# Patient Record
Sex: FEMALE | Race: WHITE | NOT HISPANIC OR LATINO | Employment: STUDENT | URBAN - METROPOLITAN AREA
[De-identification: names, ages, dates, MRNs, and addresses within clinical notes are randomized per-mention and may not be internally consistent; named-entity substitution may affect disease eponyms.]

---
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
## 2022-01-02 ENCOUNTER — MYC MEDICAL ADVICE (OUTPATIENT)
Dept: FAMILY MEDICINE | Facility: CLINIC | Age: 22
End: 2022-01-02
Payer: COMMERCIAL

## 2022-01-03 NOTE — TELEPHONE ENCOUNTER
Dr. Kelley,    Patient sends my chart stating she needs repeat covid test for work. Elzbieta YOUSSEF RN

## 2022-01-09 ENCOUNTER — HEALTH MAINTENANCE LETTER (OUTPATIENT)
Age: 22
End: 2022-01-09

## 2022-01-25 ENCOUNTER — MYC MEDICAL ADVICE (OUTPATIENT)
Dept: FAMILY MEDICINE | Facility: CLINIC | Age: 22
End: 2022-01-25
Payer: COMMERCIAL

## 2022-01-31 ENCOUNTER — ALLIED HEALTH/NURSE VISIT (OUTPATIENT)
Dept: FAMILY MEDICINE | Facility: CLINIC | Age: 22
End: 2022-01-31
Payer: COMMERCIAL

## 2022-01-31 ENCOUNTER — IMMUNIZATION (OUTPATIENT)
Dept: FAMILY MEDICINE | Facility: CLINIC | Age: 22
End: 2022-01-31
Payer: COMMERCIAL

## 2022-01-31 DIAGNOSIS — Z23 NEED FOR PROPHYLACTIC VACCINATION AND INOCULATION AGAINST INFLUENZA: Primary | ICD-10-CM

## 2022-01-31 PROCEDURE — 90471 IMMUNIZATION ADMIN: CPT

## 2022-01-31 PROCEDURE — 86580 TB INTRADERMAL TEST: CPT

## 2022-01-31 PROCEDURE — 99207 PR NO CHARGE NURSE ONLY: CPT

## 2022-01-31 PROCEDURE — 91305 COVID-19,PF,PFIZER (12+ YRS): CPT

## 2022-01-31 PROCEDURE — 90686 IIV4 VACC NO PRSV 0.5 ML IM: CPT

## 2022-01-31 PROCEDURE — 0051A COVID-19,PF,PFIZER (12+ YRS): CPT

## 2022-01-31 NOTE — NURSING NOTE
"Patient is here today for a Mantoux (TST) test placement.    Is there a current order in the chart? No. Placed order according to standing order (reference the \"Skin Test- Tuberculosis Screening- Ambulatory Care\" standing order in [employer]). Review the Inclusion and Exclusion Criteria.          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 Patient to return for second test in 1-3 weeks after first test is read.     Exclusion Criteria  None - Place order for Mantoux (TST) test per standing order.    Reason for Mantoux (TST) in patient's own words: Going to [facility]     Patient needs form signed? No - form not needed per patient.    Instructed patient to wait for 15 minutes post injection and to report any reactions immediately to staff.    Told patient to return to clinic in 48-72 hours to have Mantoux (TST) read.           "

## 2022-02-03 ENCOUNTER — ALLIED HEALTH/NURSE VISIT (OUTPATIENT)
Dept: FAMILY MEDICINE | Facility: CLINIC | Age: 22
End: 2022-02-03
Payer: COMMERCIAL

## 2022-02-03 DIAGNOSIS — Z11.1 SCREENING EXAMINATION FOR PULMONARY TUBERCULOSIS: Primary | ICD-10-CM

## 2022-02-03 LAB
PPDINDURATION: 0 MM (ref 0–4.99)
PPDREDNESS: NORMAL

## 2022-02-03 PROCEDURE — 99207 PR NO CHARGE NURSE ONLY: CPT

## 2022-02-03 NOTE — PROGRESS NOTES
Pt reports after recent covid/influenza vaccine in right arm she is experiencing aching pain under upper arm but not into axilla area. No redness, no swelling, no lumps. Cms intact. She could not show me due to her clothing. Advised pt I could make appt for a provider to look at. She will wait and see how it goes. Advised if any worsening to be seen. Pt agreed.     Red Bridges RN

## 2022-02-03 NOTE — PROGRESS NOTES
Patient is here today for a Mantoux (TST) test results.    Did patient return to clinic 48-72 hours from Mantoux (TST) placement:   Yes -     PPD Induration   Date Value Ref Range Status   02/03/2022 0 0 - 4.99 mm Final     PPD Redness   Date Value Ref Range Status   02/03/2022 Not Present  Final        Induration Size? Induration <5mm - Enter results in Enter/Edit Activity. Route results to ordering provider.     Patient needs form signed? No    Patient reports having previously had the BCG Vaccine: No    Does patient need a two step? No    Red Bridges RN

## 2022-02-21 ENCOUNTER — IMMUNIZATION (OUTPATIENT)
Dept: FAMILY MEDICINE | Facility: CLINIC | Age: 22
End: 2022-02-21
Attending: FAMILY MEDICINE
Payer: COMMERCIAL

## 2022-02-21 PROCEDURE — 0052A COVID-19,PF,PFIZER (12+ YRS): CPT

## 2022-02-21 PROCEDURE — 91305 COVID-19,PF,PFIZER (12+ YRS): CPT

## 2022-03-24 ENCOUNTER — OFFICE VISIT (OUTPATIENT)
Dept: FAMILY MEDICINE | Facility: CLINIC | Age: 22
End: 2022-03-24
Payer: COMMERCIAL

## 2022-03-24 VITALS
HEIGHT: 68 IN | WEIGHT: 230.6 LBS | OXYGEN SATURATION: 98 % | SYSTOLIC BLOOD PRESSURE: 100 MMHG | RESPIRATION RATE: 16 BRPM | DIASTOLIC BLOOD PRESSURE: 70 MMHG | TEMPERATURE: 97.5 F | BODY MASS INDEX: 34.95 KG/M2 | HEART RATE: 71 BPM

## 2022-03-24 DIAGNOSIS — F43.23 SITUATIONAL MIXED ANXIETY AND DEPRESSIVE DISORDER: ICD-10-CM

## 2022-03-24 DIAGNOSIS — R43.2 LOSS OF TASTE: Primary | ICD-10-CM

## 2022-03-24 DIAGNOSIS — R43.0 LOSS OF SMELL: ICD-10-CM

## 2022-03-24 PROCEDURE — 99213 OFFICE O/P EST LOW 20 MIN: CPT | Performed by: NURSE PRACTITIONER

## 2022-03-24 ASSESSMENT — PAIN SCALES - GENERAL: PAINLEVEL: NO PAIN (0)

## 2022-03-24 NOTE — PROGRESS NOTES
Assessment & Plan     Loss of taste  Patient has persistent loss of taste and smell since COVID 19 infection in December 2021.  I recommend using smell training with essential oils twice daily for a couple months.  If no improvement, ENT referral placed to discuss options for treatment.  Discussed that this can be more permanent in some cases but reassured that usually it will gradually come back and can take some time.   - Otolaryngology Referral; Future    Loss of smell  See note above.  - Otolaryngology Referral; Future    Situational mixed anxiety and depressive disorder  Patient has had hx of being on medication in the past and currently with COVID 19 symptoms being persistent has been struggling with this again.  She does not feel this is affecting her life at this time.  Mom feels that it has been bad.  Patient declines counseling and medication at this time.  I offered that she can call if she changes her mind and we could try Citalopram or Zoloft instead of the Lexapro since this did not work well in the past.    See Patient Instructions    Return in about 3 months (around 6/24/2022) for Follow up with ENT.    Taina Cedeño NP  [facility]    Derek Gimenez is a 22 year old who presents for the following health issues  accompanied by her mother.    HPI     Chief Complaint   Patient presents with     Covid Concern     follow up on loss of taste and smell; diagnosed with COVID 12/29; still cannot smell, taste has been altered and cannot taste much     Presents today with her mother.  Mother is concerned about her mental health because of the persistent symptoms of her loss and taste of smell.  Patient states that everything she eats tastes like disinfectant.  She has last about 30 pounds in the last couple months due to lack of wanting to eat.  However she does feel that she does have an appetite.  Patient states that she is still doing well in school and her  "depression/anxiety with everything going on does not seem to be affecting her life in that respect.  She has been on medication with Lexapro in the past but did not feel that was helpful for her symptoms and has struggled with some depression anxiety.    Review of Systems   CONSTITUTIONAL: NEGATIVE for fever, chills, change in weight  ENT/MOUTH: POSITIVE for parosmia following covid 19   RESP: NEGATIVE for significant cough or SOB  CV: NEGATIVE for chest pain, palpitations or peripheral edema  PSYCHIATRIC: POSITIVE foranxiety, appetite disturbance secondary to COVID 19 loss of taste and smell, depressed mood, Hx anxiety and Hx depression  ROS otherwise negative      Objective    /70 (BP Location: Right arm, Patient Position: Sitting, Cuff Size: Adult Large)   Pulse 71   Temp 97.5  F (36.4  C) (Tympanic)   Resp 16   Ht 1.727 m (5' 8\")   Wt 104.6 kg (230 lb 9.6 oz)   LMP 03/23/2022 (Exact Date)   SpO2 98%   Breastfeeding No   BMI 35.06 kg/m    Body mass index is 35.06 kg/m .     Physical Exam   GENERAL: healthy, alert and no distress  PSYCH: mentation appears normal and affect flat         "

## 2022-03-24 NOTE — PATIENT INSTRUCTIONS
1.  Smell training involves sniffing at least four different odors twice a day every day for several months.   Use essential oils of lemon, nae, clove, and eucalyptus.  2.  Follow-up with ENT in 2-3 months if symptoms are not improving.

## 2022-05-18 ENCOUNTER — OFFICE VISIT (OUTPATIENT)
Dept: OTOLARYNGOLOGY | Facility: CLINIC | Age: 22
End: 2022-05-18
Payer: COMMERCIAL

## 2022-05-18 VITALS
HEART RATE: 91 BPM | RESPIRATION RATE: 16 BRPM | SYSTOLIC BLOOD PRESSURE: 105 MMHG | DIASTOLIC BLOOD PRESSURE: 70 MMHG | OXYGEN SATURATION: 96 %

## 2022-05-18 DIAGNOSIS — R43.8 HYPOSMIA: Primary | ICD-10-CM

## 2022-05-18 DIAGNOSIS — J30.2 SEASONAL ALLERGIC RHINITIS, UNSPECIFIED TRIGGER: ICD-10-CM

## 2022-05-18 PROCEDURE — 99243 OFF/OP CNSLTJ NEW/EST LOW 30: CPT | Performed by: OTOLARYNGOLOGY

## 2022-05-18 RX ORDER — CETIRIZINE HYDROCHLORIDE 10 MG/1
10 TABLET ORAL DAILY
Qty: 30 TABLET | Refills: 11 | Status: SHIPPED | OUTPATIENT
Start: 2022-05-18

## 2022-05-18 RX ORDER — FLUTICASONE PROPIONATE 50 MCG
2 SPRAY, SUSPENSION (ML) NASAL DAILY
Qty: 16 G | Refills: 11 | Status: SHIPPED | OUTPATIENT
Start: 2022-05-18

## 2022-05-18 NOTE — LETTER
5/18/2022         RE: Ammy Santos  [address] [place] [phone]        Dear Colleague,    Thank you for referring your patient, Ammy Santos, to the [facility]. Please see a copy of my visit note below.    I am seeing this patient in consultation for loss of taste, loss of smell at the request of the provider Taina Pope.    Chief Complaint - loss of smell    History of Present Illness - Ammy Santos is a 22 year old female who presents for evaluation of loss of smell. This happened with covid in December. She has some smells, but overall things smell and taste chemically. She has some phantom smells. This has also affected the sense of taste. The patient has the other following nose symptoms - none, denies sinus pain, congestion, drainage. The patient notes some allergies. She doesn't take much for allergy medication. Treatments have included acupuncture. she denies trying nasal irrigations, nasal steroids, and oral antihistamines. No history of nasal trauma. No prior history of nasal surgery. nonsmoker. no epistaxis. I personally reviewed the relevant clinical notes in Epic including the primary care providers note.     Past Medical History -   Patient Active Problem List   Diagnosis     Psoriasis     Acne     Allergies -   Allergies   Allergen Reactions     Penicillins Hives     All the cillins.  Paola Edmonds         Social History -   Social History     Socioeconomic History     Marital status: Single   Tobacco Use     Smoking status: Passive Smoke Exposure - Never Smoker     Smokeless tobacco: Never Used   Vaping Use     Vaping Use: Never used   Substance and Sexual Activity     Alcohol use: No     Drug use: No     Sexual activity: Never       Family History -   Family History   Problem Relation Age of Onset     Thyroid Disease Father         psoriasis     Cancer Maternal Grandmother         lung     Heart Disease Maternal Grandmother 73         pace maker     Thyroid Disease Paternal Grandfather      C.A.D. Maternal Grandfather 73        pacemaker placed     Thyroid Disease Paternal Aunt         psoriasis     Thyroid Disease Paternal Uncle      Review of Systems - As per HPI and PMHx, otherwise 7 system review of the head and neck is negative.    Physical Exam  General - The patient is in no distress. Alert and oriented to person and place, answers questions and cooperates with examination appropriately.   Neurologic - CN II-XII are grossly intact. No focal neurologic deficits.   Voice and Breathing - The patient was breathing comfortably without the use of accessory muscles. There was no wheezing, stridor, or stertor.  The patients voice was clear and strong.  Eyes - Extraocular movements intact. Sclera were not icteric or injected, conjunctiva were pink and moist.  Mouth - Examination of the oral cavity showed pink, healthy oral mucosa. No lesions or ulcerations noted.  The tongue was mobile and midline, and the dentition were in good condition.    Throat - The walls of the oropharynx were smooth, pink, moist, symmetric, and had no lesions or ulcerations.  The tonsillar pillars and soft palate were symmetric.  The uvula was midline on elevation.  Neck -  Soft, non-tender. Palpation of the occipital, submental, submandibular, internal jugular chain, and supraclavicular nodes did not demonstrate any abnormal lymph nodes or masses. No parotid masses. Palpation of the thyroid was soft and smooth, with no nodules or goiter appreciated.  The trachea was mobile and midline.  Nose - External contour is symmetric, no gross deflection or scars.  Nasal mucosa is pink and moist with clear mucus. However the turbinates are hypertrophic. The septum was midline and non-obstructive.  No polyps, masses, or purulence noted on examination.      A/P - Ammyle Santos is a 22 year old female with hyposmia following COVID infection 6 months ago.  She does have some  congestion and evidence of allergies.  She can try Zyrtec, Flonase, nasal saline irrigations.  I also strongly recommend olfactory retraining therapy.  We discussed this in detail.  If things worsen, she begins to have fall or phantom smells, she should return we can consider nasal endoscopy and possible imaging.    Byron Lala MD  Otolaryngology  [facility]          Again, thank you for allowing me to participate in the care of your patient.        Sincerely,        Byron Lala MD

## 2022-05-18 NOTE — PROGRESS NOTES
I am seeing this patient in consultation for loss of taste, loss of smell at the request of the provider Taina Pope.    Chief Complaint - loss of smell    History of Present Illness - Ammy Santos is a 22 year old female who presents for evaluation of loss of smell. This happened with covid in December. She has some smells, but overall things smell and taste chemically. She has some phantom smells. This has also affected the sense of taste. The patient has the other following nose symptoms - none, denies sinus pain, congestion, drainage. The patient notes some allergies. She doesn't take much for allergy medication. Treatments have included acupuncture. she denies trying nasal irrigations, nasal steroids, and oral antihistamines. No history of nasal trauma. No prior history of nasal surgery. nonsmoker. no epistaxis. I personally reviewed the relevant clinical notes in Epic including the primary care providers note.     Past Medical History -   Patient Active Problem List   Diagnosis     Psoriasis     Acne     Allergies -   Allergies   Allergen Reactions     Penicillins Hives     All the cillins.  Paola Edmonds         Social History -   Social History     Socioeconomic History     Marital status: Single   Tobacco Use     Smoking status: Passive Smoke Exposure - Never Smoker     Smokeless tobacco: Never Used   Vaping Use     Vaping Use: Never used   Substance and Sexual Activity     Alcohol use: No     Drug use: No     Sexual activity: Never       Family History -   Family History   Problem Relation Age of Onset     Thyroid Disease Father         psoriasis     Cancer Maternal Grandmother         lung     Heart Disease Maternal Grandmother 73        pace maker     Thyroid Disease Paternal Grandfather      C.A.D. Maternal Grandfather 73        pacemaker placed     Thyroid Disease Paternal Aunt         psoriasis     Thyroid Disease Paternal Uncle      Review of Systems - As per HPI and PMHx, otherwise  7 system review of the head and neck is negative.    Physical Exam  General - The patient is in no distress. Alert and oriented to person and place, answers questions and cooperates with examination appropriately.   Neurologic - CN II-XII are grossly intact. No focal neurologic deficits.   Voice and Breathing - The patient was breathing comfortably without the use of accessory muscles. There was no wheezing, stridor, or stertor.  The patients voice was clear and strong.  Eyes - Extraocular movements intact. Sclera were not icteric or injected, conjunctiva were pink and moist.  Mouth - Examination of the oral cavity showed pink, healthy oral mucosa. No lesions or ulcerations noted.  The tongue was mobile and midline, and the dentition were in good condition.    Throat - The walls of the oropharynx were smooth, pink, moist, symmetric, and had no lesions or ulcerations.  The tonsillar pillars and soft palate were symmetric.  The uvula was midline on elevation.  Neck -  Soft, non-tender. Palpation of the occipital, submental, submandibular, internal jugular chain, and supraclavicular nodes did not demonstrate any abnormal lymph nodes or masses. No parotid masses. Palpation of the thyroid was soft and smooth, with no nodules or goiter appreciated.  The trachea was mobile and midline.  Nose - External contour is symmetric, no gross deflection or scars.  Nasal mucosa is pink and moist with clear mucus. However the turbinates are hypertrophic. The septum was midline and non-obstructive.  No polyps, masses, or purulence noted on examination.      A/P - Ammy Santos is a 22 year old female with hyposmia following COVID infection 6 months ago.  She does have some congestion and evidence of allergies.  She can try Zyrtec, Flonase, nasal saline irrigations.  I also strongly recommend olfactory retraining therapy.  We discussed this in detail.  If things worsen, she begins to have fall or phantom smells, she should return we  can consider nasal endoscopy and possible imaging.    Byron Lala MD  Otolaryngology  [facility]

## 2022-09-14 ENCOUNTER — APPOINTMENT (OUTPATIENT)
Dept: GENERAL RADIOLOGY | Facility: CLINIC | Age: 22
End: 2022-09-14
Attending: FAMILY MEDICINE

## 2022-09-14 ENCOUNTER — HOSPITAL ENCOUNTER (EMERGENCY)
Facility: CLINIC | Age: 22
Discharge: HOME | End: 2022-09-14
Attending: FAMILY MEDICINE | Admitting: FAMILY MEDICINE

## 2022-09-14 VITALS
WEIGHT: 220 LBS | SYSTOLIC BLOOD PRESSURE: 126 MMHG | HEIGHT: 67 IN | HEART RATE: 90 BPM | BODY MASS INDEX: 34.53 KG/M2 | TEMPERATURE: 97.3 F | OXYGEN SATURATION: 100 % | DIASTOLIC BLOOD PRESSURE: 75 MMHG | RESPIRATION RATE: 16 BRPM

## 2022-09-14 DIAGNOSIS — S49.91XA SHOULDER INJURY, RIGHT, INITIAL ENCOUNTER: ICD-10-CM
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
## 2022-09-14 DIAGNOSIS — S50.12XA CONTUSION OF LEFT FOREARM, INITIAL ENCOUNTER: ICD-10-CM

## 2022-09-14 DIAGNOSIS — S60.221A CONTUSION OF RIGHT HAND, INITIAL ENCOUNTER: ICD-10-CM

## 2022-09-14 PROCEDURE — 73090 X-RAY EXAM OF FOREARM: CPT | Mod: LT

## 2022-09-14 PROCEDURE — 250N000013 HC RX MED GY IP 250 OP 250 PS 637: Performed by: FAMILY MEDICINE

## 2022-09-14 PROCEDURE — 73030 X-RAY EXAM OF SHOULDER: CPT | Mod: LT

## 2022-09-14 PROCEDURE — 99285 EMERGENCY DEPT VISIT HI MDM: CPT | Mod: 25 | Performed by: FAMILY MEDICINE

## 2022-09-14 PROCEDURE — 73130 X-RAY EXAM OF HAND: CPT | Mod: RT

## 2022-09-14 PROCEDURE — 71046 X-RAY EXAM CHEST 2 VIEWS: CPT

## 2022-09-14 PROCEDURE — 99284 EMERGENCY DEPT VISIT MOD MDM: CPT | Performed by: FAMILY MEDICINE

## 2022-09-14 RX ORDER — IBUPROFEN 400 MG/1
400 TABLET, FILM COATED ORAL ONCE
Status: COMPLETED | OUTPATIENT
Start: 2022-09-14 | End: 2022-09-14

## 2022-09-14 RX ORDER — ACETAMINOPHEN 500 MG
1000 TABLET ORAL ONCE
Status: COMPLETED | OUTPATIENT
Start: 2022-09-14 | End: 2022-09-14

## 2022-09-14 RX ORDER — OXYCODONE HYDROCHLORIDE 5 MG/1
5 TABLET ORAL EVERY 6 HOURS PRN
Qty: 12 TABLET | Refills: 0 | Status: SHIPPED | OUTPATIENT
Start: 2022-09-14 | End: 2022-09-17

## 2022-09-14 RX ADMIN — IBUPROFEN 400 MG: 400 TABLET ORAL at 18:34

## 2022-09-14 RX ADMIN — ACETAMINOPHEN 1000 MG: 500 TABLET, FILM COATED ORAL at 18:34

## 2022-09-14 ASSESSMENT — ACTIVITIES OF DAILY LIVING (ADL): ADLS_ACUITY_SCORE: 35
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
## 2022-09-15 ENCOUNTER — PATIENT OUTREACH (OUTPATIENT)
Dept: FAMILY MEDICINE | Facility: CLINIC | Age: 22
End: 2022-09-15

## 2022-09-15 NOTE — DISCHARGE INSTRUCTIONS
Ice 20 minutes out of each hour while awake to traumatized areas next 48 hours.  Tylenol/ibuprofen as needed for pain.  Oxycodone for breakthrough pain as needed first couple of days.  Return to the emergency department if worse or changes.

## 2022-09-15 NOTE — TELEPHONE ENCOUNTER
ED / Discharge Outreach Protocol    Patient Contact    Attempt # 1    Was call answered?  No.  Left message on voicemail with information to call me back.    Marlene Denise RN  [facility]

## 2022-10-13 ENCOUNTER — TELEPHONE (OUTPATIENT)
Dept: FAMILY MEDICINE | Facility: CLINIC | Age: 22
End: 2022-10-13

## 2022-10-13 NOTE — TELEPHONE ENCOUNTER
Reason for call:  Other   Patient called regarding (reason for call): appointment  Additional comments: Please call the patients parent back to assist in scheduling a preventative care appointment for the patient with the PCP preferably, but Is willing to see any female provider for this at [place].    Phone number to reach patient:  Other phone number:  Patient parent requested you call the parent back at [phone]    Best Time:  Any    Can we leave a detailed message on this number?  NO    Travel screening: Not Applicable

## 2022-10-14 NOTE — TELEPHONE ENCOUNTER
Left message for patient to return call to clinic RN. We do not have consent on file to talk to parents.  Joy Hooks RN

## 2022-11-07 NOTE — PROGRESS NOTES
SUBJECTIVE:   CC: Ammy is an 22 year old who presents for preventive health visit.       Patient has been advised of split billing requirements and indicates understanding: Yes  Healthy Habits:     Getting at least 3 servings of Calcium per day:  NO    Bi-annual eye exam:  NO    Dental care twice a year:  NO    Sleep apnea or symptoms of sleep apnea:  None    Diet:  Regular (no restrictions)    Frequency of exercise:  None    Taking medications regularly:  Yes    Medication side effects:  None    PHQ-2 Total Score: 2    Additional concerns today:  No    Today's PHQ-2 Score:   PHQ-2 ( 1999 [employer]) 11/8/2022   Q1: Little interest or pleasure in doing things 1   Q2: Feeling down, depressed or hopeless 1   PHQ-2 Score 2   PHQ-2 Total Score (12-17 Years)- Positive if 3 or more points; Administer PHQ-A if positive -   Q1: Little interest or pleasure in doing things Several days   Q2: Feeling down, depressed or hopeless Several days   PHQ-2 Score 2     Abuse: Current or Past (Physical, Sexual or Emotional) - No  Do you feel safe in your environment? Yes    Have you ever done Advance Care Planning? (For example, a Health Directive, POLST, or a discussion with a medical provider or your loved ones about your wishes): No, advance care planning information given to patient to review.  Patient plans to discuss their wishes with loved ones or provider.      Social History     Tobacco Use     Smoking status: Never     Passive exposure: Yes     Smokeless tobacco: Never   Substance Use Topics     Alcohol use: No     If you drink alcohol do you typically have >3 drinks per day or >7 drinks per week? Not applicable    Alcohol Use 11/8/2022   Prescreen: >3 drinks/day or >7 drinks/week? No   Prescreen: >3 drinks/day or >7 drinks/week? -     Reviewed orders with patient.  Reviewed health maintenance and updated orders accordingly - Yes  Labs reviewed in EPIC  BP Readings from Last 3 Encounters:   11/08/22 128/68   09/14/22  126/75   05/18/22 105/70    Wt Readings from Last 3 Encounters:   11/08/22 99.2 kg (218 lb 9.6 oz)   09/14/22 99.8 kg (220 lb)   03/24/22 104.6 kg (230 lb 9.6 oz)                  Patient Active Problem List   Diagnosis     Psoriasis     Acne     History reviewed. No pertinent surgical history.    Social History     Tobacco Use     Smoking status: Never     Passive exposure: Yes     Smokeless tobacco: Never   Substance Use Topics     Alcohol use: No     Family History   Problem Relation Age of Onset     Thyroid Disease Father         psoriasis     Cancer Maternal Grandmother         lung     Heart Disease Maternal Grandmother 73        pace maker     SHAUN Maternal Grandfather 73        pacemaker placed     Thyroid Disease Paternal Grandfather      Thyroid Disease Paternal Aunt         psoriasis     Thyroid Disease Paternal Uncle          Current Outpatient Medications   Medication Sig Dispense Refill     cetirizine (ZYRTEC) 10 MG tablet Take 1 tablet (10 mg) by mouth daily 30 tablet 11     FLUoxetine (PROZAC) 20 MG capsule Take 20 mg by mouth daily       fluticasone (FLONASE) 50 MCG/ACT nasal spray Spray 2 sprays into both nostrils daily 16 g 11     hydrOXYzine (ATARAX) 25 MG tablet Take 25 mg by mouth 2 times daily as needed       Allergies   Allergen Reactions     Penicillins Hives     All the cillins.  Paola Edmonds         Breast Cancer Screening:  Not indicated due to age.      History of abnormal Pap smear: NO - under age 21, PAP not appropriate for age     Reviewed and updated as needed this visit by clinical staff   Tobacco  Allergies  Meds   Med Hx  Surg Hx  Fam Hx  Soc Hx        Reviewed and updated as needed this visit by Provider   Tobacco  Allergies  Meds   Med Hx  Surg Hx  Fam Hx  Soc Hx       Past Medical History:   Diagnosis Date     Closed fracture of unspecified part of radius with ulna(813.83) 6/03    fx lt forarm      History reviewed. No pertinent surgical history.    Review of  "Systems   Constitutional: Negative for chills and fever.   HENT: Negative for congestion, ear pain, hearing loss and sore throat.    Eyes: Negative for pain and visual disturbance.   Respiratory: Negative for cough and shortness of breath.    Cardiovascular: Negative for chest pain, palpitations and peripheral edema.   Gastrointestinal: Negative for abdominal pain, constipation, diarrhea, heartburn, hematochezia and nausea.   Breasts:  Negative for tenderness, breast mass and discharge.   Genitourinary: Negative for dysuria, frequency, genital sores, hematuria, pelvic pain, urgency, vaginal bleeding and vaginal discharge.   Musculoskeletal: Negative for arthralgias, joint swelling and myalgias.   Skin: Negative for rash.   Neurological: Positive for headaches. Negative for dizziness, weakness and paresthesias.   Psychiatric/Behavioral: Negative for mood changes. The patient is not nervous/anxious.      Getting headaches frequently, 4 times throughout the week.  She takes excedrin migraine helps sometimes after a couple hours will come back or will come back the next day.  No aura, no light or sound sensitivity, gets nausea but no vomiting with it, persistent and not correlating around menstrual cycle.  She does get adjusted by chiropractic frequently.  No other correlations to this.  No hx concussions or mild TBIs.  This has been going on \"as long as I can remember\".  Headache affects right upper forehead area.  Denies any congestion in the right sinus.  They last about 2-4 hours if she does nothing for treatment, if she takes the migraine pills OTC and will go away in 1 hour.  Using migraine medication occasionally once or twice a week but tries not to use it.  Trying to drink a log of fluids during the day.  Denies a lot of caffeine use.       OBJECTIVE:   /68   Pulse 92   Temp 97.3  F (36.3  C) (Tympanic)   Resp 16   Ht 1.702 m (5' 7\")   Wt 99.2 kg (218 lb 9.6 oz)   SpO2 99%   BMI 34.24 kg/m  "   Physical Exam  GENERAL: healthy, alert and no distress  EYES: Eyes grossly normal to inspection, PERRL and conjunctivae and sclerae normal  HENT: ear canals and TM's normal, nose and mouth without ulcers or lesions  NECK: no adenopathy, no asymmetry, masses, or scars and thyroid normal to palpation  RESP: lungs clear to auscultation - no rales, rhonchi or wheezes  CV: regular rate and rhythm, normal S1 S2, no S3 or S4, no murmur, click or rub, no peripheral edema and peripheral pulses strong  ABDOMEN: soft, nontender, no hepatosplenomegaly, no masses and bowel sounds normal  MS: no gross musculoskeletal defects noted, no edema  SKIN: no suspicious lesions or rashes  NEURO: Normal strength and tone, mentation intact and speech normal  BACK: no CVA tenderness, no paralumbar tenderness  PSYCH: mentation appears normal and affect normal/bright  LYMPH: no cervical adenopathy    Diagnostic Test Results:  none     ASSESSMENT/PLAN:   (Z00.00) Routine general medical examination at a health care facility  (primary encounter diagnosis)  Comment: HPV dose given today as well as Influenza.  Clinical history concerns are addressed below. Exam is normal.  Recommend follow-up in 1 year for preventative exam.  Plan: REVIEW OF HEALTH MAINTENANCE PROTOCOL ORDERS,         Human Papilloma Virus Vaccine (Gardasil 9) 3         Dose IM    (R51.9) Complaint of headache  Comment: uncertain of cause.  Does not seem to be cluster, migraine or tension from clinical history and no concerns on neurological exam.  Recommend hydrating and keeping diary of headaches and how they respond to OTC treatment, triggers and length and were the headache is, if it is quick or slow onset, etc.  Recommend use of Ibuprofen 800 mg at onset of headache or excedrin Migraine as needed since this is helpful.  Plan: Follow-up in 1 month if not controlled with OTC treatment with more than one headache every week.    Patient has been advised of split billing  "requirements and indicates understanding: Yes      COUNSELING:  Reviewed preventive health counseling, as reflected in patient instructions       Regular exercise       Healthy diet/nutrition       Vision screening       Immunizations    Vaccinated for: Human Papillomavirus and Influenza             Safe sex practices/STD prevention    Estimated body mass index is 34.24 kg/m  as calculated from the following:    Height as of this encounter: 1.702 m (5' 7\").    Weight as of this encounter: 99.2 kg (218 lb 9.6 oz).    Weight management plan: Discussed healthy diet and exercise guidelines    She reports that she has never smoked. She has been exposed to tobacco smoke. She has never used smokeless tobacco.    Taina Cedeño NP  [facility]  "

## 2022-11-07 NOTE — PATIENT INSTRUCTIONS
Keep diary of your headaches.    For now try to push fluids, take ibuprofen 800 mg dose at onset of headache or 2 excedrin Migraine tabs.  Keep this listed in your diary how often you are using them and how they are affecting the headache.  Follow-up in month for recheck on headaches if not improving with use of Ibuprofen or EM once or twice weekly to resolve headache for several days.    Preventive Health Recommendations  Female Ages 18 to 20     Yearly exam:   See your health care provider every year in order to  Review health changes.   Discuss preventive care.    Review your medicines if your doctor has prescribed any.    You should be tested each year for STDs (sexually transmitted diseases).     After age 20, talk to your provider about how often you should have cholesterol testing.    If you are at risk for diabetes, you should have a diabetes test (fasting glucose).     Shots:   Get a flu shot each year.   Get a tetanus shot every 10 years.   Consider getting the shot (vaccine) that prevents cervical cancer (Gardasil).    Nutrition:   Eat at least 5 servings of fruits and vegetables each day.  Eat whole-grain bread, whole-wheat pasta and brown rice instead of white grains and rice.  Get adequate Calcium and Vitamin D.     Lifestyle  Exercise at least 150 minutes a week each week (30 minutes a day, 5 days a week). This will help you control your weight and prevent disease.  No smoking.   Wear sunscreen to prevent skin cancer.  See your dentist every six months for an exam and cleaning.

## 2022-11-08 ENCOUNTER — OFFICE VISIT (OUTPATIENT)
Dept: FAMILY MEDICINE | Facility: CLINIC | Age: 22
End: 2022-11-08
Payer: COMMERCIAL

## 2022-11-08 VITALS
RESPIRATION RATE: 16 BRPM | SYSTOLIC BLOOD PRESSURE: 128 MMHG | HEIGHT: 67 IN | DIASTOLIC BLOOD PRESSURE: 68 MMHG | WEIGHT: 218.6 LBS | OXYGEN SATURATION: 99 % | BODY MASS INDEX: 34.31 KG/M2 | TEMPERATURE: 97.3 F | HEART RATE: 92 BPM

## 2022-11-08 DIAGNOSIS — R51.9 COMPLAINT OF HEADACHE: ICD-10-CM

## 2022-11-08 DIAGNOSIS — Z00.00 ROUTINE GENERAL MEDICAL EXAMINATION AT A HEALTH CARE FACILITY: Primary | ICD-10-CM

## 2022-11-08 PROCEDURE — 90472 IMMUNIZATION ADMIN EACH ADD: CPT | Performed by: NURSE PRACTITIONER

## 2022-11-08 PROCEDURE — 99213 OFFICE O/P EST LOW 20 MIN: CPT | Mod: 25 | Performed by: NURSE PRACTITIONER

## 2022-11-08 PROCEDURE — 90651 9VHPV VACCINE 2/3 DOSE IM: CPT | Performed by: NURSE PRACTITIONER

## 2022-11-08 PROCEDURE — 90471 IMMUNIZATION ADMIN: CPT | Performed by: NURSE PRACTITIONER

## 2022-11-08 PROCEDURE — 90686 IIV4 VACC NO PRSV 0.5 ML IM: CPT | Performed by: NURSE PRACTITIONER

## 2022-11-08 PROCEDURE — 99395 PREV VISIT EST AGE 18-39: CPT | Mod: 25 | Performed by: NURSE PRACTITIONER

## 2022-11-08 RX ORDER — HYDROXYZINE HYDROCHLORIDE 25 MG/1
25 TABLET, FILM COATED ORAL 2 TIMES DAILY PRN
COMMUNITY
Start: 2022-10-25

## 2022-11-08 ASSESSMENT — ENCOUNTER SYMPTOMS
DIARRHEA: 0
HEARTBURN: 0
CHILLS: 0
JOINT SWELLING: 0
ABDOMINAL PAIN: 0
WEAKNESS: 0
PARESTHESIAS: 0
HEADACHES: 1
SHORTNESS OF BREATH: 0
NERVOUS/ANXIOUS: 0
EYE PAIN: 0
COUGH: 0
MYALGIAS: 0
FEVER: 0
CONSTIPATION: 0
FREQUENCY: 0
PALPITATIONS: 0
BREAST MASS: 0
HEMATOCHEZIA: 0
DIZZINESS: 0
SORE THROAT: 0
HEMATURIA: 0
NAUSEA: 0
ARTHRALGIAS: 0
DYSURIA: 0

## 2022-11-08 ASSESSMENT — PAIN SCALES - GENERAL: PAINLEVEL: NO PAIN (0)

## 2023-02-07 ENCOUNTER — TELEPHONE (OUTPATIENT)
Dept: FAMILY MEDICINE | Facility: CLINIC | Age: 23
End: 2023-02-07
Payer: COMMERCIAL

## 2023-02-07 NOTE — TELEPHONE ENCOUNTER
Patient Quality Outreach    Patient is due for the following:   Cervical Cancer Screening - PAP Needed    Next Steps:   pt to schedule    Type of outreach:    Sent letter.      Questions for provider review:    None     Nicolas Retana

## 2023-03-29 ENCOUNTER — HOSPITAL ENCOUNTER (EMERGENCY)
Facility: CLINIC | Age: 23
Discharge: HOME | End: 2023-03-29
Payer: COMMERCIAL

## 2023-03-29 VITALS
RESPIRATION RATE: 16 BRPM | SYSTOLIC BLOOD PRESSURE: 119 MMHG | TEMPERATURE: 98.4 F | HEART RATE: 73 BPM | DIASTOLIC BLOOD PRESSURE: 84 MMHG | OXYGEN SATURATION: 98 %

## 2023-03-29 DIAGNOSIS — J02.9 PHARYNGITIS: ICD-10-CM

## 2023-03-29 LAB — DEPRECATED S PYO AG THROAT QL EIA: NEGATIVE

## 2023-03-29 PROCEDURE — 250N000012 HC RX MED GY IP 250 OP 636 PS 637

## 2023-03-29 PROCEDURE — 87651 STREP A DNA AMP PROBE: CPT

## 2023-03-29 PROCEDURE — 99213 OFFICE O/P EST LOW 20 MIN: CPT

## 2023-03-29 PROCEDURE — G0463 HOSPITAL OUTPT CLINIC VISIT: HCPCS

## 2023-03-29 RX ADMIN — DEXAMETHASONE 10 MG: 2 TABLET ORAL at 18:43

## 2023-03-29 NOTE — DISCHARGE INSTRUCTIONS
Return for any new or worsening symptoms as discussed.  Can continue with Tylenol ibuprofen as needed for discomfort or fevers.  Dose of Decadron today for symptom management over the next few days.

## 2023-03-29 NOTE — ED PROVIDER NOTES
Chief Complaint:   Chief Complaint   Patient presents with     Pharyngitis         HPI:     Ammy Santos is a 23 year old female who presents to the  with a 1 day history of sore throat.  She denies any other significant associated symptoms.  She has not had Rhinorrhea, Cough, Recent exposure to Strep, Fever, Rash, Nausea, Vomitting, Diarrhea.  She has tried acetaminophen, ibuprofen mild relief of symptoms.  She has not had a rash. He has not been exposed to Strep.     Medications:   Current Outpatient Medications   Medication Sig Dispense Refill     cetirizine (ZYRTEC) 10 MG tablet Take 1 tablet (10 mg) by mouth daily 30 tablet 11     FLUoxetine (PROZAC) 20 MG capsule Take 20 mg by mouth daily       fluticasone (FLONASE) 50 MCG/ACT nasal spray Spray 2 sprays into both nostrils daily 16 g 11     hydrOXYzine (ATARAX) 25 MG tablet Take 25 mg by mouth 2 times daily as needed         Allergies:   Allergies   Allergen Reactions     Penicillins Hives     All the cillins.  Paola Edmonds         Medications updated and reviewed.  Past, family and surgical history is updated and reviewed in the record.     Review of Systems:  General: see HPI  HENT: see HPI  Skin: see HPI    Physical Exam:   /84   Pulse 73   Temp 98.4  F (36.9  C) (Oral)   Resp 16   SpO2 98%    General: Patient is well nourished, well developed, well groomed and in no acute distress  Ears: negative  Nose: no drainage.  Mouth/Throat: bilateral adenopathy (R>L), erythematous, rapid strep negative. Trismus is not present. Muffled voice is not present. Uvular shift is not present.   Neck: Neck supple. No adenopathy.  Chest/Pulmonary: normal and clear to auscultation. S1S2, RRR, no Murmur      Medical Decision Making:  Sore throat with no exam findings to suggest peritonsillar abscess.  The rapid strep test was NEGATIVE.  A back up strep culture is being done.  Symptomatic cares discussed - Gargle, use acetaminophen or other OTC analgesic.    Offered COVID and influenza testing today which the patient declined.    Assessment:  Viral pharyngitis    Plan:   We will treat symptoms of pharyngitis and antibiotics are not indicated.  The dose of Decadron provided in department today.    She was advised to gargle with warm salt water 4 times a day and try to drink as much fluid as possible. Use acetaminophen, ibuprofen, Chloraseptic spray, Cepacol lozenges for discomfort. Return to the ER with increased pain, inability to swallow fluids, fever, rash or any concerns.     Condition on disposition: Stable      Disclaimer:  This note consists of symbols derived from keyboarding, dictation and/or voice recognition software.  As a result, there may be errors in the script that have gone undetected.  Please consider this when interpreting information found in this chart.       Edy Crespo, NATHAN CNP  03/29/23 [id number]

## 2023-03-30 LAB — GROUP A STREP BY PCR: NOT DETECTED

## 2023-04-01 ENCOUNTER — MYC MEDICAL ADVICE (OUTPATIENT)
Dept: FAMILY MEDICINE | Facility: CLINIC | Age: 23
End: 2023-04-01
Payer: COMMERCIAL

## 2023-04-02 ENCOUNTER — HOSPITAL ENCOUNTER (EMERGENCY)
Facility: CLINIC | Age: 23
Discharge: HOME | End: 2023-04-02
Attending: PHYSICIAN ASSISTANT | Admitting: PHYSICIAN ASSISTANT
Payer: COMMERCIAL

## 2023-04-02 VITALS
DIASTOLIC BLOOD PRESSURE: 85 MMHG | HEIGHT: 67 IN | TEMPERATURE: 98 F | WEIGHT: 218 LBS | RESPIRATION RATE: 16 BRPM | HEART RATE: 93 BPM | SYSTOLIC BLOOD PRESSURE: 127 MMHG | BODY MASS INDEX: 34.21 KG/M2 | OXYGEN SATURATION: 97 %

## 2023-04-02 DIAGNOSIS — H66.002 ACUTE SUPPURATIVE OTITIS MEDIA OF LEFT EAR WITHOUT SPONTANEOUS RUPTURE OF TYMPANIC MEMBRANE, RECURRENCE NOT SPECIFIED: ICD-10-CM

## 2023-04-02 DIAGNOSIS — J06.9 URI (UPPER RESPIRATORY INFECTION): ICD-10-CM

## 2023-04-02 DIAGNOSIS — J02.9 ACUTE PHARYNGITIS, UNSPECIFIED ETIOLOGY: ICD-10-CM

## 2023-04-02 PROCEDURE — 99213 OFFICE O/P EST LOW 20 MIN: CPT | Performed by: PHYSICIAN ASSISTANT

## 2023-04-02 PROCEDURE — G0463 HOSPITAL OUTPT CLINIC VISIT: HCPCS | Performed by: PHYSICIAN ASSISTANT

## 2023-04-02 RX ORDER — CEFDINIR 300 MG/1
300 CAPSULE ORAL 2 TIMES DAILY
Qty: 20 CAPSULE | Refills: 0 | Status: SHIPPED | OUTPATIENT
Start: 2023-04-02 | End: 2023-04-12

## 2023-04-02 ASSESSMENT — ENCOUNTER SYMPTOMS
COUGH: 1
CONSTITUTIONAL NEGATIVE: 1
SORE THROAT: 1
FEVER: 0

## 2023-04-02 ASSESSMENT — ACTIVITIES OF DAILY LIVING (ADL): ADLS_ACUITY_SCORE: 35

## 2023-04-02 NOTE — LETTER
April 2, 2023      To Whom It May Concern:      Ammy Santos was seen in our Emergency Department today, 04/02/23, for ear infection and upper respiratory infection.  Thank you.      Sincerely,        Lexi Nicolas PA-C

## 2023-04-02 NOTE — ED TRIAGE NOTES
Patient presents for continued sore throat and now has nasal congestion. Taking mucinex with no relief.      Triage Assessment     Row Name 04/02/23 0907       Triage Assessment (Adult)    Airway WDL WDL       Respiratory WDL    Respiratory WDL WDL       Skin Circulation/Temperature WDL    Skin Circulation/Temperature WDL WDL       Cognitive/Neuro/Behavioral WDL    Cognitive/Neuro/Behavioral WDL WDL

## 2023-04-02 NOTE — Clinical Note
Ammy Santos was seen and treated in our emergency department on 4/2/2023.    Patient was evaluated in the Urgent Care on 4/2/2023 for ear infection and upper respiratory infection.      Sincerely,     [facility] Emergency Dept

## 2023-04-02 NOTE — Clinical Note
Ammy Santos was seen and treated in our emergency department on 4/2/2023.    Patient was evaluated in the Urgent Care on 4/2/2023 for ear infection and upper respiratory infection.      Sincerely,     [facility] Emergency Dept

## 2023-04-02 NOTE — ED PROVIDER NOTES
History     Chief Complaint   Patient presents with     Cold Symptoms     Continues to have a sore throat. Now has nasal congestion       HPI  Ammy Santos is a 23 year old female who presents with complaints of ongoing sore throat, nasal congestion, rhinorrhea, cough, and now bilateral ear pain.  Symptoms started 4 to 5 days ago.  She had a negative strep test at the onset of her illness.  Symptoms have been worsening since that time.  Denies fevers, chills, ear drainage, nausea, vomiting, diarrhea, abdominal pain, chest pain, or shortness of breath.  Denies any known ill contacts.  No COVID-19 testing completed at home.      Allergies:  Allergies   Allergen Reactions     Penicillins Hives     All the cillins.  Paola Edmonds         Problem List:    Patient Active Problem List    Diagnosis Date Noted     Psoriasis 08/21/2014     Priority: Medium     Acne 08/21/2014     Priority: Medium        Past Medical History:    Past Medical History:   Diagnosis Date     Closed fracture of unspecified part of radius with ulna(813.83) 6/03       Past Surgical History:    No past surgical history on file.    Family History:    Family History   Problem Relation Age of Onset     Thyroid Disease Father         psoriasis     Cancer Maternal Grandmother         lung     Heart Disease Maternal Grandmother 73        pace maker     C.A.D. Maternal Grandfather 73        pacemaker placed     Thyroid Disease Paternal Grandfather      Thyroid Disease Paternal Aunt         psoriasis     Thyroid Disease Paternal Uncle        Social History:  Marital Status:  Single [1]  Social History     Tobacco Use     Smoking status: Never     Passive exposure: Yes     Smokeless tobacco: Never   Vaping Use     Vaping status: Never Used     Passive vaping exposure: Yes   Substance Use Topics     Alcohol use: No     Drug use: No        Medications:    cefdinir (OMNICEF) 300 MG capsule  cetirizine (ZYRTEC) 10 MG tablet  FLUoxetine (PROZAC) 20 MG  "capsule  fluticasone (FLONASE) 50 MCG/ACT nasal spray  hydrOXYzine (ATARAX) 25 MG tablet          Review of Systems   Constitutional: Negative.  Negative for fever.   HENT: Positive for congestion, ear pain and sore throat.    Respiratory: Positive for cough.    All other systems reviewed and are negative.      Physical Exam   BP: 127/85  Pulse: 93  Temp: 98  F (36.7  C)  Resp: 16  Height: 170.2 cm (5' 7\")  Weight: 98.9 kg (218 lb)  SpO2: 97 %      Physical Exam  Constitutional:       General: She is not in acute distress.     Appearance: Normal appearance. She is well-developed. She is not ill-appearing, toxic-appearing or diaphoretic.   HENT:      Head: Normocephalic and atraumatic.      Right Ear: Tympanic membrane, ear canal and external ear normal.      Left Ear: Ear canal and external ear normal. A middle ear effusion is present. Tympanic membrane is erythematous and bulging.      Nose: Mucosal edema, congestion and rhinorrhea present.      Mouth/Throat:      Lips: Pink.      Pharynx: Uvula midline. Posterior oropharyngeal erythema present. No pharyngeal swelling, oropharyngeal exudate or uvula swelling.      Tonsils: No tonsillar exudate or tonsillar abscesses. 1+ on the right. 1+ on the left.   Eyes:      Extraocular Movements: Extraocular movements intact.      Conjunctiva/sclera: Conjunctivae normal.      Pupils: Pupils are equal, round, and reactive to light.   Cardiovascular:      Rate and Rhythm: Normal rate and regular rhythm.      Heart sounds: Normal heart sounds.   Pulmonary:      Effort: Pulmonary effort is normal. No respiratory distress.      Breath sounds: Normal breath sounds. No stridor. No wheezing, rhonchi or rales.   Musculoskeletal:         General: Normal range of motion.      Cervical back: Full passive range of motion without pain, normal range of motion and neck supple. No rigidity. Normal range of motion.   Lymphadenopathy:      Cervical: No cervical adenopathy.   Skin:     General: " Skin is warm and dry.   Neurological:      Mental Status: She is alert and oriented to person, place, and time.   Psychiatric:         Behavior: Behavior is cooperative.         ED Course                 Procedures    No results found for this or any previous visit (from the past 24 hour(s)).    Medications - No data to display    Assessments & Plan (with Medical Decision Making)     Pt is a 23 year old female who presents with complaints of ongoing sore throat, nasal congestion, rhinorrhea, cough, and now bilateral ear pain.  Symptoms started 4 to 5 days ago.  She had a negative strep test at the onset of her illness.  Symptoms have been worsening since that time.      Pt is afebrile on arrival.  Exam as above.  Patient has evidence of left acute otitis media on exam.  Likely secondary to concurrent viral URI.  Recommended COVID-19 testing at home as well.  Encouraged symptomatic treatments at home.  Return precautions were reviewed.  Hand-outs were provided.    Patient was sent with Cefdinir and was instructed to follow-up with PCP for continued care and management.  She is to return to the ED for persistent and/or worsening symptoms.  Patient expressed understanding of the diagnosis and plan and was discharged home in good condition.    I have reviewed the nursing notes.    I have reviewed the findings, diagnosis, plan and need for follow up with the patient.    Discharge Medication List as of 4/2/2023  9:32 AM      START taking these medications    Details   cefdinir (OMNICEF) 300 MG capsule Take 1 capsule (300 mg) by mouth 2 times daily for 10 days, Disp-20 capsule, R-0, E-Prescribe             Final diagnoses:   Acute suppurative otitis media of left ear without spontaneous rupture of tympanic membrane, recurrence not specified   Acute pharyngitis, unspecified etiology   URI (upper respiratory infection)       4/2/2023   [facility] EMERGENCY DEPT      Disclaimer:  This note consists of symbols  derived from keyboarding, dictation and/or voice recognition software.  As a result, there may be errors in the script that have gone undetected.  Please consider this when interpreting information found in this chart.     Lexi Nicolas PA-C  04/02/23 [id number]

## 2023-04-03 NOTE — TELEPHONE ENCOUNTER
Pt was seen in urgent care after she sent message about refill of prednisone for throat pain. Encounter closed. Elzbieta Haynes RN

## 2023-04-04 ENCOUNTER — MYC MEDICAL ADVICE (OUTPATIENT)
Dept: FAMILY MEDICINE | Facility: CLINIC | Age: 23
End: 2023-04-04
Payer: COMMERCIAL

## 2023-04-04 ENCOUNTER — E-VISIT (OUTPATIENT)
Dept: URGENT CARE | Facility: CLINIC | Age: 23
End: 2023-04-04
Payer: COMMERCIAL

## 2023-04-04 DIAGNOSIS — B30.9 VIRAL CONJUNCTIVITIS: Primary | ICD-10-CM

## 2023-04-04 PROCEDURE — 99421 OL DIG E/M SVC 5-10 MIN: CPT | Performed by: PHYSICIAN ASSISTANT

## 2023-04-04 NOTE — PATIENT INSTRUCTIONS
Thank you for choosing us for your care. Based on your symptoms and length of illness, I do not think that you need an antibiotic prescription at this time.  Symptoms are suggestive of viral not bacterial pink eye. Please follow the care advise I've provided and use the prescribed medication to help relieve your symptoms. View your full visit summary for details by clicking on the link below.     If you're not feeling better within  2-3days, please respond to this message and we can consider if an antibiotic prescription is needed.  You can schedule an appointment right here in [facility], or call [phone]  If the visit is for the same symptoms as your eVisit, we'll refund the cost of your eVisit if seen within seven days     Conjunctivitis, Nonspecific    The membrane that covers the white part of your eye (the conjunctiva) is inflamed. Inflammation happens when your body responds to an injury, allergic reaction, infection, or illness. Symptoms of inflammation in the eye may include redness, irritation, itching, swelling, or burning. These symptoms should go away within the next 24 hours. Conjunctivitis may be related to a particle that was in your eye. If so, it may wash out with your tears or irrigation treatment. Being exposed to liquid chemicals or fumes may also cause this reaction.    Home care  Put a cold pack on the eye for 20 minutes at a time. This will reduce pain. To make a cold pack, put ice cubes in a plastic bag that seals at the top. Wrap the bag in a clean, thin towel or cloth.  Artificial tears may be prescribed to reduce irritation or redness. These should be used 3 to 4 times a day.  You may use acetaminophen or ibuprofen to control pain, unless another medicine was prescribed. If you have chronic liver or kidney disease, talk with your healthcare provider before using these medicines. Also talk with your provider if you have ever had a stomach ulcer or gastrointestinal bleeding.  If you wear  contact lenses, don't use them until your healthcare provider says it's OK.    Follow-up care  Follow up with your healthcare provider, or as advised.   When to seek medical advice  Call your healthcare provider right away if any of the following occur:   Eyelid swells more  Eye pain gets worse  Redness or drainage from the eye gets worse  Blurry vision gets worse, or you have increased sensitivity to light  Normal vision does not return within 24 to 48 hours  [employer] last reviewed this educational content on 6/1/2022 2000-2022 [employer]. All rights reserved. This information is not intended as a substitute for professional medical care. Always follow your healthcare professional's instructions.

## 2023-05-31 ENCOUNTER — OFFICE VISIT (OUTPATIENT)
Dept: DERMATOLOGY | Facility: CLINIC | Age: 23
End: 2023-05-31
Payer: COMMERCIAL

## 2023-05-31 DIAGNOSIS — L40.9 SCALP PSORIASIS: Primary | ICD-10-CM

## 2023-05-31 PROCEDURE — 99213 OFFICE O/P EST LOW 20 MIN: CPT | Performed by: DERMATOLOGY

## 2023-05-31 RX ORDER — KETOCONAZOLE 20 MG/ML
SHAMPOO TOPICAL DAILY PRN
Qty: 240 ML | Refills: 11 | Status: SHIPPED | OUTPATIENT
Start: 2023-05-31

## 2023-05-31 NOTE — LETTER
5/31/2023         RE: Ammy Santos  [address] [facility] [address]        Dear Colleague,    Thank you for referring your patient, Ammy Santos, to the [facility]. Please see a copy of my visit note below.    Ammy Santos is an extremely pleasant 23 year old year old female patient here today for f/u psoriasis.  LOV got IL TAC which helps but it came back.  Patient has no other skin complaints today.  Remainder of the HPI, Meds, PMH, Allergies, FH, and SH was reviewed in chart.      Past Medical History:   Diagnosis Date     Closed fracture of unspecified part of radius with ulna(813.83) 6/03    fx lt forarm       No past surgical history on file.     Family History   Problem Relation Age of Onset     Thyroid Disease Father         psoriasis     Cancer Maternal Grandmother         lung     Heart Disease Maternal Grandmother 73        pace maker     C.A.D. Maternal Grandfather 73        pacemaker placed     Thyroid Disease Paternal Grandfather      Thyroid Disease Paternal Aunt         psoriasis     Thyroid Disease Paternal Uncle        Social History     Socioeconomic History     Marital status: Single     Spouse name: Not on file     Number of children: Not on file     Years of education: Not on file     Highest education level: Not on file   Occupational History     Not on file   Tobacco Use     Smoking status: Never     Passive exposure: Yes     Smokeless tobacco: Never   Vaping Use     Vaping status: Never Used     Passive vaping exposure: Yes   Substance and Sexual Activity     Alcohol use: No     Drug use: No     Sexual activity: Never   Other Topics Concern     Not on file   Social History Narrative     Not on file     Social Determinants of Health     Financial Resource Strain: Not on file   Food Insecurity: Not on file   Transportation Needs: Not on file   Physical Activity: Not on file   Stress: Not on file   Social Connections: Not on file   Intimate Partner  Violence: Not on file   Housing Stability: Not on file       Outpatient Encounter Medications as of 5/31/2023   Medication Sig Dispense Refill     cetirizine (ZYRTEC) 10 MG tablet Take 1 tablet (10 mg) by mouth daily 30 tablet 11     FLUoxetine (PROZAC) 20 MG capsule Take 20 mg by mouth daily       fluticasone (FLONASE) 50 MCG/ACT nasal spray Spray 2 sprays into both nostrils daily 16 g 11     hydrOXYzine (ATARAX) 25 MG tablet Take 25 mg by mouth 2 times daily as needed       No facility-administered encounter medications on file as of 5/31/2023.             O:   NAD, WDWN, Alert & Oriented, Mood & Affect wnl, Vitals stable   Here today alone   General appearance normal   Vitals stable   Alert, oriented and in no acute distress  Red scaly well demarcated plaques in scalp          Eyes: Conjunctivae/lids:Normal     ENT: Lips, buccal mucosa, tongue: normal    MSK:Normal    Cardiovascular: peripheral edema none    Pulm: Breathing Normal    Neuro/Psych: Orientation:Alert and Orientedx3 ; Mood/Affect:normal       A/P:  1. Scalp psoriasis  Biologics discussed with patient   Light therapy discussed with patient   Methotrexate discussed with patient   She prefers topicals  nizoral daily   Clobetasol foam sent to compounding pharmacy  Return to clinic 2-3 months  It was a pleasure speaking to Ammy Santos today.        Again, thank you for allowing me to participate in the care of your patient.        Sincerely,        Maged Glez MD

## 2023-05-31 NOTE — PROGRESS NOTES
Ammy Santos is an extremely pleasant 23 year old year old female patient here today for f/u psoriasis.  LOV got IL TAC which helps but it came back.  Patient has no other skin complaints today.  Remainder of the HPI, Meds, PMH, Allergies, FH, and SH was reviewed in chart.      Past Medical History:   Diagnosis Date     Closed fracture of unspecified part of radius with ulna(813.83) 6/03    fx lt forarm       No past surgical history on file.     Family History   Problem Relation Age of Onset     Thyroid Disease Father         psoriasis     Cancer Maternal Grandmother         lung     Heart Disease Maternal Grandmother 73        pace maker     C.A.D. Maternal Grandfather 73        pacemaker placed     Thyroid Disease Paternal Grandfather      Thyroid Disease Paternal Aunt         psoriasis     Thyroid Disease Paternal Uncle        Social History     Socioeconomic History     Marital status: Single     Spouse name: Not on file     Number of children: Not on file     Years of education: Not on file     Highest education level: Not on file   Occupational History     Not on file   Tobacco Use     Smoking status: Never     Passive exposure: Yes     Smokeless tobacco: Never   Vaping Use     Vaping status: Never Used     Passive vaping exposure: Yes   Substance and Sexual Activity     Alcohol use: No     Drug use: No     Sexual activity: Never   Other Topics Concern     Not on file   Social History Narrative     Not on file     Social Determinants of Health     Financial Resource Strain: Not on file   Food Insecurity: Not on file   Transportation Needs: Not on file   Physical Activity: Not on file   Stress: Not on file   Social Connections: Not on file   Intimate Partner Violence: Not on file   Housing Stability: Not on file       Outpatient Encounter Medications as of 5/31/2023   Medication Sig Dispense Refill     cetirizine (ZYRTEC) 10 MG tablet Take 1 tablet (10 mg) by mouth daily 30 tablet 11     FLUoxetine  (PROZAC) 20 MG capsule Take 20 mg by mouth daily       fluticasone (FLONASE) 50 MCG/ACT nasal spray Spray 2 sprays into both nostrils daily 16 g 11     hydrOXYzine (ATARAX) 25 MG tablet Take 25 mg by mouth 2 times daily as needed       No facility-administered encounter medications on file as of 5/31/2023.             O:   NAD, WDWN, Alert & Oriented, Mood & Affect wnl, Vitals stable   Here today alone   General appearance normal   Vitals stable   Alert, oriented and in no acute distress  Red scaly well demarcated plaques in scalp          Eyes: Conjunctivae/lids:Normal     ENT: Lips, buccal mucosa, tongue: normal    MSK:Normal    Cardiovascular: peripheral edema none    Pulm: Breathing Normal    Neuro/Psych: Orientation:Alert and Orientedx3 ; Mood/Affect:normal       A/P:  1. Scalp psoriasis  Biologics discussed with patient   Light therapy discussed with patient   Methotrexate discussed with patient   She prefers topicals  nizoral daily   Clobetasol foam sent to compounding pharmacy  Return to clinic 2-3 months  It was a pleasure speaking to Ammy Santos today.

## 2023-06-23 ENCOUNTER — TELEPHONE (OUTPATIENT)
Dept: FAMILY MEDICINE | Facility: CLINIC | Age: 23
End: 2023-06-23
Payer: COMMERCIAL

## 2023-06-23 NOTE — LETTER
June 23, 2023    To  Ammy Santos  [address] [place] [id number]    Your team at [facility] cares about your health. We have reviewed your chart and based on our findings; we are making the following recommendations to better manage your health.     You are in particular need of attention regarding the following:     Schedule a primary care office visit with your provider for a Pap Smear to screen for Cervical Cancer.    If you have already completed these items, please contact the clinic via phone or   exsulinhart so your care team can review and update your records. Thank you for   choosing [facility] for your healthcare needs. For any questions,   concerns, or to schedule an appointment please contact our clinic.    Healthy Regards,      Your [facility] Care Team

## 2023-06-27 ENCOUNTER — OFFICE VISIT (OUTPATIENT)
Dept: DERMATOLOGY | Facility: CLINIC | Age: 23
End: 2023-06-27
Payer: COMMERCIAL

## 2023-06-27 DIAGNOSIS — L40.9 SCALP PSORIASIS: Primary | ICD-10-CM

## 2023-06-27 DIAGNOSIS — L40.9 PSORIASIS: ICD-10-CM

## 2023-06-27 PROCEDURE — 99213 OFFICE O/P EST LOW 20 MIN: CPT | Performed by: PHYSICIAN ASSISTANT

## 2023-06-27 RX ORDER — TAPINAROF 10 MG/1000MG
1 CREAM TOPICAL DAILY
Qty: 60 G | Refills: 11 | Status: SHIPPED | OUTPATIENT
Start: 2023-06-27

## 2023-06-27 ASSESSMENT — PAIN SCALES - GENERAL: PAINLEVEL: NO PAIN (0)

## 2023-06-27 NOTE — LETTER
6/27/2023         RE: Ammy Santos  [address] [facility] [id number]        Dear Colleague,    Thank you for referring your patient, Ammy Santos, to the [facility]. Please see a copy of my visit note below.    Ammy Santos is an extremely pleasant 23 year old year old female patient here today for f/u psoriasis.  She has tried intralesional topical steroids, clobetasol foam. She is interested in trying something different.   Past Medical History:   Diagnosis Date     Closed fracture of unspecified part of radius with ulna(813.83) 6/03    fx lt forarm       No past surgical history on file.     Family History   Problem Relation Age of Onset     Thyroid Disease Father         psoriasis     Cancer Maternal Grandmother         lung     Heart Disease Maternal Grandmother 73        pace maker     C.A.D. Maternal Grandfather 73        pacemaker placed     Thyroid Disease Paternal Grandfather      Thyroid Disease Paternal Aunt         psoriasis     Thyroid Disease Paternal Uncle        Social History     Socioeconomic History     Marital status: Single     Spouse name: Not on file     Number of children: Not on file     Years of education: Not on file     Highest education level: Not on file   Occupational History     Not on file   Tobacco Use     Smoking status: Never     Passive exposure: Yes     Smokeless tobacco: Never   Vaping Use     Vaping Use: Never used   Substance and Sexual Activity     Alcohol use: No     Drug use: No     Sexual activity: Never   Other Topics Concern     Not on file   Social History Narrative     Not on file     Social Determinants of Health     Financial Resource Strain: Not on file   Food Insecurity: Not on file   Transportation Needs: Not on file   Physical Activity: Not on file   Stress: Not on file   Social Connections: Not on file   Intimate Partner Violence: Not on file   Housing Stability: Not on file       Outpatient Encounter Medications as  of 6/27/2023   Medication Sig Dispense Refill     Tapinarof (VTAMA) 1 % CREA Externally apply 1 Application topically daily 60 g 11     cetirizine (ZYRTEC) 10 MG tablet Take 1 tablet (10 mg) by mouth daily (Patient not taking: Reported on 6/27/2023) 30 tablet 11     COMPOUNDED NON-CONTROLLED SUBSTANCE (CMPD RX) - PHARMACY TO MIX COMPOUNDED MEDICATION Apply one to two times per day , Clobetasol foam 0.05%/Camph 0.5%/ Menth 0.5%/ promox 1% foam (Patient not taking: Reported on 6/27/2023) 30 g 11     FLUoxetine (PROZAC) 20 MG capsule Take 20 mg by mouth daily (Patient not taking: Reported on 6/27/2023)       fluticasone (FLONASE) 50 MCG/ACT nasal spray Spray 2 sprays into both nostrils daily (Patient not taking: Reported on 6/27/2023) 16 g 11     hydrOXYzine (ATARAX) 25 MG tablet Take 25 mg by mouth 2 times daily as needed (Patient not taking: Reported on 6/27/2023)       ketoconazole (NIZORAL) 2 % external shampoo Apply topically daily as needed for itching or irritation Wash scalp leave in 2-5 min and rinse 2-3 times per week (Patient not taking: Reported on 6/27/2023) 240 mL 11     No facility-administered encounter medications on file as of 6/27/2023.             O:   NAD, WDWN, Alert & Oriented, Mood & Affect wnl, Vitals stable   Here today alone   General appearance normal   Vitals stable   Alert, oriented and in no acute distress      Psoriasiform plaques on scalp, few on body         Eyes: Conjunctivae/lids:Normal     ENT: Lips: normal    MSK:Normal    Pulm: Breathing Normal    Neuro/Psych: Orientation:Alert and Orientedx3 ; Mood/Affect:normal       A/P:  1. Psoriasis   Discussed systemic and new topical.   She would like to try new topical medication vtama sent in [facility] pharmacy.   Sent vtama in to pharmacy, see how effective this is in treating your skin over the next 2-3 months.       Again, thank you for allowing me to participate in the care of your patient.        Sincerely,        Angie Irizarry  ALDAIR Hernandez

## 2023-06-27 NOTE — NURSING NOTE
Chief Complaint   Patient presents with     Psoriasis     Scalp and spreading to rest of body, not currently using anything        There were no vitals filed for this visit.  Wt Readings from Last 1 Encounters:   04/02/23 98.9 kg (218 lb)       Joan Sharma LPN .................6/27/2023

## 2023-06-28 ENCOUNTER — TELEPHONE (OUTPATIENT)
Dept: DERMATOLOGY | Facility: CLINIC | Age: 23
End: 2023-06-28

## 2023-06-28 NOTE — TELEPHONE ENCOUNTER
PRIOR AUTHORIZATION DENIED    Medication: TAPINAROF 1 % EX CREA  Insurance Company: [employer] [place] - Phone [phone] Fax [phone]  Denial Date: 6/28/2023  Denial Rational:         Appeal Information:    Patient Notified: No

## 2023-07-01 NOTE — PROGRESS NOTES
Ammy Santos is an extremely pleasant 23 year old year old female patient here today for f/u psoriasis.  She has tried intralesional topical steroids, clobetasol foam, clobetasol shampoo, lidex solution. She is interested in trying something different.   Past Medical History:   Diagnosis Date     Closed fracture of unspecified part of radius with ulna(813.83) 6/03    fx lt forarm       No past surgical history on file.     Family History   Problem Relation Age of Onset     Thyroid Disease Father         psoriasis     Cancer Maternal Grandmother         lung     Heart Disease Maternal Grandmother 73        pace maker     C.A.D. Maternal Grandfather 73        pacemaker placed     Thyroid Disease Paternal Grandfather      Thyroid Disease Paternal Aunt         psoriasis     Thyroid Disease Paternal Uncle        Social History     Socioeconomic History     Marital status: Single     Spouse name: Not on file     Number of children: Not on file     Years of education: Not on file     Highest education level: Not on file   Occupational History     Not on file   Tobacco Use     Smoking status: Never     Passive exposure: Yes     Smokeless tobacco: Never   Vaping Use     Vaping Use: Never used   Substance and Sexual Activity     Alcohol use: No     Drug use: No     Sexual activity: Never   Other Topics Concern     Not on file   Social History Narrative     Not on file     Social Determinants of Health     Financial Resource Strain: Not on file   Food Insecurity: Not on file   Transportation Needs: Not on file   Physical Activity: Not on file   Stress: Not on file   Social Connections: Not on file   Intimate Partner Violence: Not on file   Housing Stability: Not on file       Outpatient Encounter Medications as of 6/27/2023   Medication Sig Dispense Refill     Tapinarof (VTAMA) 1 % CREA Externally apply 1 Application topically daily 60 g 11     cetirizine (ZYRTEC) 10 MG tablet Take 1 tablet (10 mg) by mouth daily (Patient  not taking: Reported on 6/27/2023) 30 tablet 11     COMPOUNDED NON-CONTROLLED SUBSTANCE (CMPD RX) - PHARMACY TO MIX COMPOUNDED MEDICATION Apply one to two times per day , Clobetasol foam 0.05%/Camph 0.5%/ Menth 0.5%/ promox 1% foam (Patient not taking: Reported on 6/27/2023) 30 g 11     FLUoxetine (PROZAC) 20 MG capsule Take 20 mg by mouth daily (Patient not taking: Reported on 6/27/2023)       fluticasone (FLONASE) 50 MCG/ACT nasal spray Spray 2 sprays into both nostrils daily (Patient not taking: Reported on 6/27/2023) 16 g 11     hydrOXYzine (ATARAX) 25 MG tablet Take 25 mg by mouth 2 times daily as needed (Patient not taking: Reported on 6/27/2023)       ketoconazole (NIZORAL) 2 % external shampoo Apply topically daily as needed for itching or irritation Wash scalp leave in 2-5 min and rinse 2-3 times per week (Patient not taking: Reported on 6/27/2023) 240 mL 11     No facility-administered encounter medications on file as of 6/27/2023.             O:   NAD, WDWN, Alert & Oriented, Mood & Affect wnl, Vitals stable   Here today alone   General appearance normal   Vitals stable   Alert, oriented and in no acute distress      Psoriasiform plaques on scalp, few on body         Eyes: Conjunctivae/lids:Normal     ENT: Lips: normal    MSK:Normal    Pulm: Breathing Normal    Neuro/Psych: Orientation:Alert and Orientedx3 ; Mood/Affect:normal       A/P:  1. Psoriasis   Discussed systemic and new topical.   She would like to try new topical medication vtama sent in [facility] pharmacy.   Sent vtama in to pharmacy, see how effective this is in treating your skin over the next 2-3 months.

## 2023-09-08 NOTE — TELEPHONE ENCOUNTER
Medication Appeal Initiation    We have initiated an appeal for the requested medication:  Medication: TAPINAROF 1 % EX CREA  Appeal Start Date:  9/8/2023  Insurance Company: [employer] [place] - Phone [phone] Fax [phone]   Insurance Phone:   Insurance Fax: [phone]  Comments:

## 2023-09-08 NOTE — TELEPHONE ENCOUNTER
MEDICATION APPEAL APPROVED    Medication: TAPINAROF 1 % EX CREA  Authorization Effective Date: 6/10/2023  Authorization Expiration Date: 9/8/2024  Approved Dose/Quantity:   Reference #:     Appeal Insurance Company: [employer] [place] - Phone [phone] Fax [phone]   Expected CoPay:       CoPay Card Available:    Financial Assistance Needed:   Which Pharmacy is filling the prescription: [address]

## 2023-10-09 ENCOUNTER — PATIENT OUTREACH (OUTPATIENT)
Dept: CARE COORDINATION | Facility: CLINIC | Age: 23
End: 2023-10-09
Payer: COMMERCIAL

## 2023-10-23 ENCOUNTER — PATIENT OUTREACH (OUTPATIENT)
Dept: CARE COORDINATION | Facility: CLINIC | Age: 23
End: 2023-10-23
Payer: COMMERCIAL

## 2024-02-03 ENCOUNTER — HEALTH MAINTENANCE LETTER (OUTPATIENT)
Age: 24
End: 2024-02-03

## 2024-05-06 ENCOUNTER — PATIENT OUTREACH (OUTPATIENT)
Dept: CARE COORDINATION | Facility: CLINIC | Age: 24
End: 2024-05-06

## 2025-03-02 ENCOUNTER — HEALTH MAINTENANCE LETTER (OUTPATIENT)
Age: 25
End: 2025-03-02

## 2025-04-15 ENCOUNTER — HOSPITAL ENCOUNTER (EMERGENCY)
Facility: CLINIC | Age: 25
Discharge: HOME | End: 2025-04-15
Payer: COMMERCIAL

## 2025-04-15 ENCOUNTER — APPOINTMENT (OUTPATIENT)
Dept: GENERAL RADIOLOGY | Facility: CLINIC | Age: 25
End: 2025-04-15
Payer: COMMERCIAL

## 2025-04-15 VITALS
TEMPERATURE: 98.3 F | HEART RATE: 73 BPM | SYSTOLIC BLOOD PRESSURE: 120 MMHG | OXYGEN SATURATION: 98 % | RESPIRATION RATE: 16 BRPM | DIASTOLIC BLOOD PRESSURE: 89 MMHG
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
## 2025-04-15 DIAGNOSIS — M54.6 ACUTE BILATERAL THORACIC BACK PAIN: ICD-10-CM

## 2025-04-15 PROCEDURE — 99213 OFFICE O/P EST LOW 20 MIN: CPT

## 2025-04-15 PROCEDURE — 72072 X-RAY EXAM THORAC SPINE 3VWS: CPT

## 2025-04-15 PROCEDURE — G0463 HOSPITAL OUTPT CLINIC VISIT: HCPCS | Mod: 25

## 2025-04-15 PROCEDURE — 250N000011 HC RX IP 250 OP 636: Mod: JZ

## 2025-04-15 PROCEDURE — 96372 THER/PROPH/DIAG INJ SC/IM: CPT

## 2025-04-15 RX ORDER — CYCLOBENZAPRINE HCL 10 MG
10 TABLET ORAL 3 TIMES DAILY PRN
Qty: 15 TABLET | Refills: 0 | Status: SHIPPED | OUTPATIENT
Start: 2025-04-15

## 2025-04-15 RX ORDER — KETOROLAC TROMETHAMINE 30 MG/ML
30 INJECTION, SOLUTION INTRAMUSCULAR; INTRAVENOUS ONCE
Status: COMPLETED | OUTPATIENT
Start: 2025-04-15 | End: 2025-04-15

## 2025-04-15 RX ADMIN — KETOROLAC TROMETHAMINE 30 MG: 30 INJECTION, SOLUTION INTRAMUSCULAR; INTRAVENOUS at 19:17

## 2025-04-15 ASSESSMENT — ACTIVITIES OF DAILY LIVING (ADL): ADLS_ACUITY_SCORE: 41

## 2025-04-15 ASSESSMENT — COLUMBIA-SUICIDE SEVERITY RATING SCALE - C-SSRS
6. HAVE YOU EVER DONE ANYTHING, STARTED TO DO ANYTHING, OR PREPARED TO DO ANYTHING TO END YOUR LIFE?: NO
2. HAVE YOU ACTUALLY HAD ANY THOUGHTS OF KILLING YOURSELF IN THE PAST MONTH?: NO
1. IN THE PAST MONTH, HAVE YOU WISHED YOU WERE DEAD OR WISHED YOU COULD GO TO SLEEP AND NOT WAKE UP?: NO
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
## 2025-04-15 NOTE — DISCHARGE INSTRUCTIONS
Your back x-ray looks good, no signs of fractures or malalignment.  Do not take any other NSAIDs for the next 24 hours.  You can take Flexeril, a muscle relaxant, up to 3 times a day as needed for spasms.  This will make you drowsy.  You can take Tylenol if needed for pain.  Please follow-up with your primary provider in the next 1 to 2 weeks to recheck.  Return to ER if you develop severe pain, numbness or tingling, or anything else that is concerning to you.
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
## 2025-05-29 ENCOUNTER — HOSPITAL ENCOUNTER (EMERGENCY)
Facility: CLINIC | Age: 25
Discharge: HOME | End: 2025-05-29
Attending: FAMILY MEDICINE | Admitting: FAMILY MEDICINE
Payer: COMMERCIAL

## 2025-05-29 VITALS
DIASTOLIC BLOOD PRESSURE: 87 MMHG | OXYGEN SATURATION: 98 % | HEART RATE: 85 BPM | WEIGHT: 210 LBS | HEIGHT: 67 IN | TEMPERATURE: 98.5 F | BODY MASS INDEX: 32.96 KG/M2 | SYSTOLIC BLOOD PRESSURE: 128 MMHG

## 2025-05-29 DIAGNOSIS — L50.9 HIVES: ICD-10-CM

## 2025-05-29 PROCEDURE — 99283 EMERGENCY DEPT VISIT LOW MDM: CPT | Performed by: FAMILY MEDICINE

## 2025-05-29 RX ORDER — PREDNISONE 20 MG/1
20 TABLET ORAL 2 TIMES DAILY
Qty: 10 TABLET | Refills: 0 | Status: SHIPPED | OUTPATIENT
Start: 2025-05-29 | End: 2025-06-03

## 2025-05-29 ASSESSMENT — ACTIVITIES OF DAILY LIVING (ADL): ADLS_ACUITY_SCORE: 41

## 2025-05-29 ASSESSMENT — COLUMBIA-SUICIDE SEVERITY RATING SCALE - C-SSRS
2. HAVE YOU ACTUALLY HAD ANY THOUGHTS OF KILLING YOURSELF IN THE PAST MONTH?: NO
6. HAVE YOU EVER DONE ANYTHING, STARTED TO DO ANYTHING, OR PREPARED TO DO ANYTHING TO END YOUR LIFE?: NO
1. IN THE PAST MONTH, HAVE YOU WISHED YOU WERE DEAD OR WISHED YOU COULD GO TO SLEEP AND NOT WAKE UP?: NO

## 2025-05-29 NOTE — ED PROVIDER NOTES
HPI   Patient is a 25-year-old female presenting with hives.  She has not had similar symptoms previously.  She took ibuprofen last night for an ankle sprain and shortly after she recognized pruritus and rash.  It worsened through the night.  She took Zyrtec and it did not seem to help.  She denies oral swelling.  No trouble with speech, swallowing, or breathing.  No chest tightness or wheezing.  No nausea or vomiting.  No diarrhea.  No lightheadedness or fainting.  No fever or other systemic infectious symptoms.      Allergies:  Allergies   Allergen Reactions    Penicillins Hives     All the cillins.  Paola Edmonds       Problem List:    Patient Active Problem List    Diagnosis Date Noted    Psoriasis 08/21/2014     Priority: Medium    Acne 08/21/2014     Priority: Medium      Past Medical History:    Past Medical History:   Diagnosis Date    Closed fracture of unspecified part of radius with ulna(813.83) 6/03     Past Surgical History:    No past surgical history on file.  Family History:    Family History   Problem Relation Age of Onset    Thyroid Disease Father         psoriasis    Cancer Maternal Grandmother         lung    Heart Disease Maternal Grandmother 73        pace maker    C.A.D. Maternal Grandfather 73        pacemaker placed    Thyroid Disease Paternal Grandfather     Thyroid Disease Paternal Aunt         psoriasis    Thyroid Disease Paternal Uncle      Social History:  Marital Status:  Single [1]  Social History     Tobacco Use    Smoking status: Never     Passive exposure: Yes    Smokeless tobacco: Never   Vaping Use    Vaping status: Never Used   Substance Use Topics    Alcohol use: No    Drug use: No      Medications:    predniSONE (DELTASONE) 20 MG tablet  cetirizine (ZYRTEC) 10 MG tablet  COMPOUNDED NON-CONTROLLED SUBSTANCE (CMPD RX) - PHARMACY TO MIX COMPOUNDED MEDICATION  cyclobenzaprine (FLEXERIL) 10 MG tablet  FLUoxetine (PROZAC) 20 MG capsule  fluticasone (FLONASE) 50 MCG/ACT nasal  "spray  hydrOXYzine (ATARAX) 25 MG tablet  ketoconazole (NIZORAL) 2 % external shampoo  Roflumilast (ZORYVE) 0.3 % CREA  Tapinarof (VTAMA) 1 % CREA      Review of Systems   All other systems reviewed and are negative.      PE   BP: 128/87  Pulse: 85  Temp: 98.5  F (36.9  C)  Height: 170.2 cm (5' 7\")  Weight: 95.3 kg (210 lb)  SpO2: 98 %  Physical Exam  Vitals reviewed.   Constitutional:       Appearance: She is well-developed.   HENT:      Head: Normocephalic and atraumatic.      Right Ear: External ear normal.      Left Ear: External ear normal.      Nose: Nose normal.      Mouth/Throat:      Mouth: Mucous membranes are moist.      Pharynx: Oropharynx is clear.   Eyes:      Extraocular Movements: Extraocular movements intact.      Conjunctiva/sclera: Conjunctivae normal.      Pupils: Pupils are equal, round, and reactive to light.   Cardiovascular:      Rate and Rhythm: Normal rate and regular rhythm.   Pulmonary:      Effort: Pulmonary effort is normal.   Musculoskeletal:         General: Normal range of motion.      Cervical back: Normal range of motion.   Skin:     General: Skin is warm and dry.      Comments: Diffuse hives from the face down to the torso, limited involvement of the extremities.   Neurological:      Mental Status: She is oriented to person, place, and time.   Psychiatric:         Behavior: Behavior normal.         ED COURSE and [facility]   1805.  Patient with hives after ibuprofen.  Allergy referral order placed.  Continue Zyrtec.  Prednisone ordered.    Electronic medical chart reviewed, including medical problems, medications, medical allergies, social history.  Recent hospitalizations and surgical procedures reviewed.  Recent clinic visits and consultations reviewed.  Recent labs and test results reviewed.  Nursing notes reviewed.    The patient, their parent if applicable, and/or their medical decision maker(s) and I have reviewed all of the available historical information, applicable PM, " physical exam findings, and objective diagnostic data gathered during this ED visit.  We then discussed all work-up options and then together agreed upon the course taken during this visit.  The ultimate disposition and plan was a cooperative decision made between myself and the patient, their parent if applicable, and/or their legal decision maker(s).  The risks and benefits of all decisions made during this visit were discussed to the best of my abilities given the circumstances, and all parties are understanding of the pertinent ramifications of these decisions.      LABS  Labs Ordered and Resulted from Time of ED Arrival to Time of ED Departure - No data to display    IMAGING  Images reviewed by me.  Radiology report also reviewed.  No orders to display       Procedures    Medications - No data to display      IMPRESSION       ICD-10-CM    1. Hives  L50.9 Adult Allergy/Asthma  Referral    ibuprofen related               Medication List        Started      predniSONE 20 MG tablet  Commonly known as: DELTASONE  20 mg, Oral, 2 TIMES DAILY                                  Felipe Caruso MD  05/29/25 6933

## 2025-05-29 NOTE — ED TRIAGE NOTES
Pt took ibuprofen (600mg) for ankle pain last night. Took zyrtec at 6 am. Pt feeling itchy last night but no rash. This AM pt feeling more itchy and now having rash on bilateral arms, chest, stomach, face, back and going to legs. Pt denied difficulty breathing, increase in tongue thickness, coughing/clearing throat, and/or CP.      Triage Assessment (Adult)       Row Name 05/29/25 0197          Triage Assessment    Airway WDL WDL        Respiratory WDL    Respiratory WDL WDL        Skin Circulation/Temperature WDL    Skin Circulation/Temperature WDL WDL        Cardiac WDL    Cardiac WDL WDL        Peripheral/Neurovascular WDL    Peripheral Neurovascular WDL WDL        Cognitive/Neuro/Behavioral WDL    Cognitive/Neuro/Behavioral WDL WDL

## 2025-05-29 NOTE — DISCHARGE INSTRUCTIONS
RETURN TO THE EMERGENCY ROOM FOR THE FOLLOWING:    Severely worsened breathing, new difficulty with swallowing or talking, fainting, vomiting and diarrhea, or at anytime for any concern.    FOLLOW UP:    Allergy clinic referral order placed at the time of discharge, expect a phone call within the next few days to help with scheduling.    TREATMENT RECOMMENDATIONS:    Prednisone 20 mg twice a day for 5 days.    NURSE ADVICE LINE:  [phone] or [phone]

## 2025-06-02 ENCOUNTER — PATIENT OUTREACH (OUTPATIENT)
Dept: CARE COORDINATION | Facility: CLINIC | Age: 25
End: 2025-06-02
Payer: COMMERCIAL